# Patient Record
Sex: FEMALE | Race: BLACK OR AFRICAN AMERICAN | Employment: FULL TIME | URBAN - METROPOLITAN AREA
[De-identification: names, ages, dates, MRNs, and addresses within clinical notes are randomized per-mention and may not be internally consistent; named-entity substitution may affect disease eponyms.]

---

## 2023-09-20 ENCOUNTER — OFFICE VISIT (OUTPATIENT)
Dept: OCCUPATIONAL MEDICINE | Age: 48
End: 2023-09-20
Attending: FAMILY MEDICINE

## 2025-03-24 ENCOUNTER — OFFICE VISIT (OUTPATIENT)
Dept: RHEUMATOLOGY | Facility: CLINIC | Age: 50
End: 2025-03-24
Payer: MEDICAID

## 2025-03-24 VITALS
DIASTOLIC BLOOD PRESSURE: 72 MMHG | RESPIRATION RATE: 16 BRPM | HEIGHT: 60 IN | BODY MASS INDEX: 31.83 KG/M2 | HEART RATE: 81 BPM | OXYGEN SATURATION: 99 % | TEMPERATURE: 97 F | SYSTOLIC BLOOD PRESSURE: 120 MMHG | WEIGHT: 162.13 LBS

## 2025-03-24 DIAGNOSIS — M77.01 GOLFERS ELBOW OF RIGHT UPPER EXTREMITY: ICD-10-CM

## 2025-03-24 DIAGNOSIS — G47.09 OTHER INSOMNIA: ICD-10-CM

## 2025-03-24 DIAGNOSIS — Z51.81 THERAPEUTIC DRUG MONITORING: ICD-10-CM

## 2025-03-24 DIAGNOSIS — L40.50 PSORIATIC ARTHRITIS (HCC): Primary | ICD-10-CM

## 2025-03-24 DIAGNOSIS — L40.9 PSORIASIS: ICD-10-CM

## 2025-03-24 DIAGNOSIS — M53.3 SACROILIAC JOINT DISEASE: ICD-10-CM

## 2025-03-24 DIAGNOSIS — M77.11 RIGHT TENNIS ELBOW: ICD-10-CM

## 2025-03-24 DIAGNOSIS — Z11.59 NEED FOR HEPATITIS C SCREENING TEST: ICD-10-CM

## 2025-03-24 DIAGNOSIS — Z11.59 NEED FOR HEPATITIS B SCREENING TEST: ICD-10-CM

## 2025-03-24 DIAGNOSIS — E55.9 VITAMIN D DEFICIENCY: ICD-10-CM

## 2025-03-24 DIAGNOSIS — Z11.1 SCREENING FOR TUBERCULOSIS: ICD-10-CM

## 2025-03-24 PROBLEM — S92.511P: Status: ACTIVE | Noted: 2024-05-07

## 2025-03-24 PROBLEM — M19.071 OSTEOARTHRITIS OF RIGHT FOOT: Status: ACTIVE | Noted: 2024-05-07

## 2025-03-24 PROBLEM — M54.16 LUMBAR RADICULOPATHY: Status: ACTIVE | Noted: 2024-08-19

## 2025-03-24 PROBLEM — M20.40 HAMMER TOE, ACQUIRED: Status: ACTIVE | Noted: 2024-05-07

## 2025-03-24 RX ORDER — PHYTONADIONE 2 MG/ML
1 INJECTION, EMULSION INTRAMUSCULAR; INTRAVENOUS; SUBCUTANEOUS DAILY
COMMUNITY

## 2025-03-24 RX ORDER — IBUPROFEN 600 MG/1
600 TABLET, FILM COATED ORAL EVERY 6 HOURS PRN
COMMUNITY
Start: 2023-12-15

## 2025-03-24 RX ORDER — LIDOCAINE 50 MG/G
1 PATCH TOPICAL
COMMUNITY
Start: 2023-12-14

## 2025-03-24 RX ORDER — LOPERAMIDE HYDROCHLORIDE 2 MG/1
2 CAPSULE ORAL 4 TIMES DAILY PRN
COMMUNITY
Start: 2025-03-14

## 2025-03-24 RX ORDER — HYDROCODONE BITARTRATE AND ACETAMINOPHEN 5; 325 MG/1; MG/1
1 TABLET ORAL EVERY 4 HOURS
COMMUNITY

## 2025-03-24 NOTE — PROGRESS NOTES
Rheumatology New Patient Note  =====================================================================================================    Date of visit: 3/24/2025  ?  Chief complaint: +RF, polyarthralgia  Chief Complaint   Patient presents with    New Patient     Referred by primary for elevated rheumatoid factor. Patient has pain in her elbow, both knees, back, and toes on her rt foot.   Rapid 3 score is 5.7     ?  Referring (will send letter)  PCP  Kurtis Poon  [facility] Phone: [phone] ext [phone]  [facility] Fax: [phone]    =====================================================================================================  HPI    Niharika Ambrocio is a 49 year old female     Here for evaluation of positive rheumatoid factor and polyarthralgia.    She experiences significant joint pain, particularly in her left greater than right lateral and medial elbows.  Diagnosed with lateral epicondylitis in the past.  She has tried physical therapy, including exercises like squeezing balls of different sizes and strengths, but has not found significant relief. Pain is also present in her knuckles and fingers, which sometimes 'drop by themselves'.  -Takes considerable amounts of ibuprofen which does help.    She has a history of back pain, which she attributes to her  service.  She did not have back pain prior to her  service.  Various treatments including systemic steroids, lumbar epidural injections, physical therapy, and chiropractic care have been attempted, but she continues to experience persistent pain. The pain worsens in cold weather and is described as 'horrible'. She has been taking ibuprofen, but was advised to reduce the dosage due to concerns about liver health. She also reports using Norco and lidocaine patches without significant relief.    She experiences sleep disturbances, getting only about four hours of sleep per night. She has been using Tylenol PM and Aleve PM to aid sleep, but  has been taking up to five pills at a time, which she acknowledges is excessive. She has tried melatonin without success.  -She notes that she is quite resistant to any medications that cause sedation    Her family history is notable for arthritis in both parents, with her father also having psoriasis. She herself has a history of psoriasis but has not had an outbreak in years. She reports that her father used to make a juice cocktail for cramps, but it does not work for her.    She has a history of osteoarthritis and has broken toes in the right foot, which she believes are related to her  service. She also reports a history of sciatica, which she feels is not accurately diagnosed as it causes her entire back to hurt. She has allergies to red blood cell infusions, fentanyl, and iodine. She does not smoke, use chewing tobacco, or recreational drugs.    14 point ROS negative except noted above    Medications:   Cholecalciferol 50 MCG (2000 UT) Oral Cap Take 2,000 Units by mouth daily.      HYDROcodone-acetaminophen 5-325 MG Oral Tab Take 1 tablet by mouth every 4 (four) hours.      ibuprofen 600 MG Oral Tab Take 1 tablet (600 mg total) by mouth every 6 (six) hours as needed.      lidocaine 5 % External Patch Place 1 patch vaginally every 28 days.      loperamide 2 MG Oral Cap Take 1 capsule (2 mg total) by mouth 4 (four) times daily as needed.      phytonadione, vitamin K1, 1 mg/0.5mL Injection Solution 0.5 mL (1 mg total) daily.      amitriptyline 25 MG Oral Tab Take 1 tablet (25 mg total) by mouth nightly for 30 days, THEN 2 tablets (50 mg total) nightly. 150 tablet 1       Past Medical History:  Past Medical History:    Hyperlipidemia    Prediabetes     Past Surgical History:  Past Surgical History:   Procedure Laterality Date          x 3    Hysterectomy      partial    Other surgical history      breast reduction    Other surgical history      fibroid removal    Other surgical history Right      rt foot surgery     Family History:  Family History   Problem Relation Age of Onset    Other (arthritis) Father         RA     Social History:  Social History     Socioeconomic History    Marital status: Single   Tobacco Use    Smoking status: Never    Smokeless tobacco: Never   Vaping Use    Vaping status: Never Used   Substance and Sexual Activity    Alcohol use: Yes     Comment: ocassionally per pt    Drug use: Never     Social Drivers of Health     Food Insecurity: No Food Insecurity (2/25/2025)    Received from [facility]    Hunger Vital Sign     Worried About Running Out of Food in the Last Year: Never true     Ran Out of Food in the Last Year: Never true   Transportation Needs: No Transportation Needs (2/25/2025)    Received from [facility]    PRAPARE - Transportation     Lack of Transportation (Medical): No     Lack of Transportation (Non-Medical): No   Stress: Not at Risk (1/11/2025)    Received from [facility]    Stress     Stress: 1   Housing Stability: High Risk (2/25/2025)    Received from [facility]    Housing Stability Vital Sign     Unable to Pay for Housing in the Last Year: Yes     ?  Allergies:  Allergies[1]      Objective    Vitals:    03/24/25 1514   BP: 120/72   Pulse: 81   Resp: 16   Temp: 97 °F (36.1 °C)   SpO2: 99%   Weight: 162 lb 1.9 oz (73.5 kg)   Height: 5' (1.524 m)       GEN: NAD, well-nourished.   HEENT: Head: NCAT. Face: No lesions. Eyes: Conjunctiva clear. Sclera are anicteric. PERRLA. EOMs are full. Ears: The right and left ear canals are clear.  Nose: No external or internal nasal deformities. Nasal septum is midline. Mouth: The lips are within normal limits.  No oral ulcers Tongue is midline with no lesions. The oral cavity is clear.   Neck: Supple. No neck masses. No thyromegaly. No LAD, parotid or submandicular gland palpated.   PULM: easy effort  Extremities: No cyanosis, edema or deformities.   Neurologic: Strength,  CN2-12 grossly intact   Psych: normal affect.   Skin: No lesions or rashes.  MSK: 28 joint count performed. No evidence of synovitis in mcp, pip, dip, wrist, elbows, shoulders, hips, knees, ankles, mtp unless otherwise noted. Full ROM of elbows, wrists, knees.    Hands- No ulceration/lesions noted. No swelling in IPJs, no TTP or synovitis noted in joints of hand   Wrists- No pain with wrist flexion and extension. No swelling, erythema, or increased warmth.   Elbows- No swelling, erythema, or increased warmth.   Shoulders- FROM, abduction ~180 degrees bilaterally.    Knees- No swelling, erythema, or increased warmth. AROM flexion/extension ~0-180 degrees.    No valgus/varus laxities appreciated.   Ankles/Feet- No swelling, erythema, or increased warmth.    No peripheral joint synovitis    TTP in cervical/thoacic/lumbar spine pain  -TTP in the lateral/medial epicondyle of the right arm     [place]  ?Passive dorsiflexion of the fifth finger >90 degrees with forearm flat: no    ?Passive apposition of the thumb to the flexor aspect of the forearm: no    ?Hyperextension of elbow >10 degrees: yes on the right    ?Hyperextensibility of the knee >10 degrees: bilateral knee    ?Flexion of waist with palms on the floor (and with the knees fully extended): no    Labs:    3/2025  CBC W differential WNL  Creatinine 1.04, rest of CMP WNL  U/A without any protein    1/2025  Colonoscopy without any IBD    11/2024  Hepatitis B surface antigen, HCV negative  MANASA by IFA is negative  RPR is negative  RF 16.3    2021  RF 17.7  No results found for: \"WBC\", \"RBC\", \"HGB\", \"HCT\", \"PLT\", \"MPV\", \"MCV\", \"MCH\", \"MCHC\", \"RDW\", \"NEPRELIM\", \"NEUTABS\", \"LYMPHABS\", \"EOSABS\", \"BASABS\", \"NEUT\", \"LYMPH\", \"MON\", \"EOS\", \"BASO\", \"NEPERCENT\", \"LYPERCENT\", \"MOPERCENT\", \"EOPERCENT\", \"BAPERCENT\", \"NE\", \"LYMABS\", \"MOABSO\", \"EOABSO\", \"BAABSO\"  No results found for: \"GLU\", \"BUN\", \"BUNCREA\", \"CREATSERUM\", \"ANIONGAP\", \"GFR\", \"GFRNAA\", \"GFRAA\", \"CA\", \"OSMOCALC\",  \"ALKPHO\", \"AST\", \"ALT\", \"ALKPHOS\", \"BILT\", \"TP\", \"ALB\", \"GLOBULIN\", \"AGRATIO\", \"NA\", \"K\", \"CL\", \"CO2\"      No results found for: \"ANATI\", \"MANASA\", \"ANAS\", \"ANASCRN\", \"ANASCRNRFLX\", \"KENDRICK\"  No results found for: \"SSA\", \"SSAUR\", \"ANTISSA\", \"SSA52\", \"SSA60\", \"SSADD\", \"SSB\", \"ANTISSB\"  No results found for: \"DSDNA\", \"ANTIDSDNA\", \"SMUD\", \"ANTISM\", \"SM\", \"RNP\", \"ANTIRNP\", \"SMITHRNP\"  No results found for: \"SCL70\", \"SCL\", \"SDJGEKV24\"  No results found for: \"C3\", \"C4\"  No results found for: \"DRVVT\", \"LAINT\", \"PTTLUPUS\", \"LUPUSINTERP\", \"LA\", \"H4CF4UPNNK\", \"Q2OT6RQYSP\", \"X9MPSSXPMJ\", \"H0OYRXIVYS\"  No results found for: \"CARDIOLIPIGG\", \"CARDIOLIPIGM\", \"CARDIOLIPIGA\", \"CARDIOIGA\", \"CARLIP\"      Additional Labs:    Radiology:    12/2023 L-spine MRI  Findings:   No compression fractures or aggressive bone lesions are observed in the lumbar spine which tilts slightly to the right side. Small Schmorl's node defects along the endplates are scattered along the lumbar spine region. Minimal degenerative osteophytes are scattered in the L-spine with mild scattered disc space narrowing. The designated L5 vertebral body has mild transitional characteristics. Mild dural ectasia is noted in the upper sacral region of doubtful significance. Conus appears unremarkable.   No disc herniations, significant spinal stenosis or significant foraminal narrowing is noted on the study. A mild left-sided foraminal narrowing is noted at L3-4 without nerve root encroachment. There is at most minimal disc bulging and hypertrophied posterior elements scattered in the lumbar spine without significant encroachment on adjacent neural elements.   Impression:  Minimal degenerative changes are noted in the lumbar spine causing no significant encroachment on adjacent neural elements.     4/2024 XR right foot:  There is deformity of the medial aspect of the distal aspect of the proximal phalanx of the second digit.   This most likely is a remote deformity.   The  osseous structures are otherwise intact.   There is no acute fracture.   There is joint space narrowing and spurring of the first metatarsal-phalangeal joint.   The other joint spaces are maintained.   There is no joint effusion of the right ankle.     Radiology review:      =====================================================================================================  Assessment and Plan  Assessment:  1. Psoriatic arthritis (HCC)    2. Need for hepatitis B screening test    3. Screening for tuberculosis    4. Need for hepatitis C screening test    5. Sacroiliac joint disease    6. Right tennis elbow    7. Golfers elbow of right upper extremity    8. Therapeutic drug monitoring    9. Vitamin D deficiency    10. Other insomnia    11. Psoriasis      #Suspect Psoriatic Arthritis  #Psoriasis: long history  #Right lateral, right medial epicondylitis  #Chronic axial pain  -Given recalcitrant right lateral/medial epicondylitis and chronic axial pain in the context of known history of psoriasis, query peripheral + axial psoriatic arthritis   -Issues confounded by localized hypermobility of the bilateral knees and right elbow    #Low titer rheumatoid factor  -Suspect this is a false positive or clinically insignificant given the level is so low and there is no evidence of any small/medium joint inflammatory arthritis that would be consistent with rheumatoid arthritis    #Chronic Back Pain  -Relatively mild lumbar DJD based on last MRI from 2023  -Previous treatments including steroids, cortisone injections, and physical therapy were ineffective. Pain exacerbated by cold weather.     #Sleep Disturbance  Chronic sleep disturbance with only 4 hours of sleep per night. Previous use of Tylenol PM and Aleve PM was ineffective.    #known Vitamin D Deficiency  Severe vitamin D deficiency, currently taking vitamin D with K2 supplements.    Plan:  - Order additional blood work to assess for inflammatory arthritis, general  arthralgia testing.  Add on HLA-B27, CCP, uric acid, hepatitis B core antibody total, Quant gold, TSH, ferritin, B12/folate  - Order x-ray of the sacroiliac joint to evaluate for sacroiliitis, right elbow x-ray to evaluate for enthesopathy/fracture  -Could consider methotrexate and Otezla in the future as the patient is only partially controlled with high-dose ibuprofen.  - Refer to Dr. Martínez for evaluation and possible cortisone injection for tennis elbow and golfer's elbow.  - Recommend Voltaren gel for elbow pain. Consider counterforce brace  -Home exercise program from the [employer]  - Prescribe amitriptyline 25 mg for sleep, increase to 50 mg if needed after one month.  This will help with any nociplastic pain/pain amplification that may be contributing to her chronic low back pain  - Continue current vitamin D supplementation regimen.    Rtc 6 weeks    Diagnoses and all orders for this visit:    Psoriatic arthritis (HCC)  -     Hep B Core AB, Tot; Future  -     Quantiferon TB Plus; Future  -     HLA B 27 Disease Association; Future  -     Uric Acid; Future  -     Cyclic Citrullinate Pep. IGG; Future  -     TSH W Reflex To Free T4; Future  -     B12 AND FOLATE; Future  -     Ferritin; Future  -     Physiatry Referral - [place]    Need for hepatitis B screening test  -     Hep B Core AB, Tot; Future    Screening for tuberculosis  -     Quantiferon TB Plus; Future    Need for hepatitis C screening test    Sacroiliac joint disease  -     XR SACROILIAC JOINTS (MIN 3 VIEWS) (CPT=72202); Future  -     XR ELBOW, COMPLETE (MIN 3 VIEWS), RIGHT (CPT=73080); Future    Right tennis elbow  -     Physiatry Referral - [place]  -     XR SACROILIAC JOINTS (MIN 3 VIEWS) (CPT=72202); Future  -     XR ELBOW, COMPLETE (MIN 3 VIEWS), RIGHT (CPT=73080); Future    Golfers elbow of right upper extremity  -     Physiatry Referral - [place]    Therapeutic drug monitoring  -     Hep B Core AB, Tot;  Future  -     Quantiferon TB Plus; Future  -     HLA B 27 Disease Association; Future  -     Uric Acid; Future  -     Cyclic Citrullinate Pep. IGG; Future  -     TSH W Reflex To Free T4; Future  -     B12 AND FOLATE; Future  -     Ferritin; Future  -     Physiatry Referral - [place]  -     XR SACROILIAC JOINTS (MIN 3 VIEWS) (CPT=72202); Future  -     XR ELBOW, COMPLETE (MIN 3 VIEWS), RIGHT (CPT=73080); Future  -     Vitamin D, 25-Hydroxy; Future    Vitamin D deficiency  -     Hep B Core AB, Tot; Future  -     Quantiferon TB Plus; Future  -     HLA B 27 Disease Association; Future  -     Uric Acid; Future  -     Cyclic Citrullinate Pep. IGG; Future  -     TSH W Reflex To Free T4; Future  -     B12 AND FOLATE; Future  -     Ferritin; Future  -     Physiatry Referral - [place]  -     XR SACROILIAC JOINTS (MIN 3 VIEWS) (CPT=72202); Future  -     XR ELBOW, COMPLETE (MIN 3 VIEWS), RIGHT (CPT=73080); Future  -     Vitamin D, 25-Hydroxy; Future    Other insomnia  -     amitriptyline 25 MG Oral Tab; Take 1 tablet (25 mg total) by mouth nightly for 30 days, THEN 2 tablets (50 mg total) nightly.    Psoriasis        Return in about 6 weeks (around 5/5/2025).      The above plan of care, diagnosis, orders, and follow-up were discussed with the patient. Questions related to this recommended plan of care were answered.    Thank you for referring this delightful patient to me. Please feel free to contact me with any questions.     This report was performed utilizing speech recognition software technology. Despite proofreading, speech recognition errors could escape detection. If a word or phrase is confusing or out of context, please do not hesitate to call for   clarification.       Kind regards      David Goode MD  [facility]         [1]   Allergies  Allergen Reactions    Red Blood Cells UNKNOWN     Other reaction(s): Transfusion reaction   Anti-K      For additional information, please contact [facility] at  [phone].    Fentanyl DIZZINESS, OTHER (SEE COMMENTS) and NAUSEA AND VOMITING    Iodine (Topical) ITCHING and RASH    Povidone Iodine RASH

## 2025-03-24 NOTE — PATIENT INSTRUCTIONS
See Dr. Martínez for tennis/golfer's elbow; consider injection    Get x-ray of the sacroiliac joint and right elbow              Purchase Voltaren (diclofenac) 1% gel over-the-counter. It is in an orange packaging.     Total dose should not exceed 32 g per day, overall affected joints. Voltaren Gel should  be measured onto the enclosed dosing card to the appropriate 2 g or 4 g designation.   Knees: Apply the gel (4 g) to the affected area up to 4 times daily. Do not apply  more than 16 g daily to any one affected joint of the lower extremities.   Hands, wrists, elbows: Apply the gel (2 g) to the affected area up to 4 times daily. Do not apply  more than 8 g daily to any one affected joint of the upper extremities

## 2025-05-27 ENCOUNTER — HOSPITAL ENCOUNTER (OUTPATIENT)
Dept: GENERAL RADIOLOGY | Age: 50
Discharge: HOME | End: 2025-05-27
Attending: INTERNAL MEDICINE
Payer: MEDICAID

## 2025-05-27 ENCOUNTER — OFFICE VISIT (OUTPATIENT)
Dept: RHEUMATOLOGY | Facility: CLINIC | Age: 50
End: 2025-05-27
Payer: MEDICAID

## 2025-05-27 VITALS
WEIGHT: 164 LBS | RESPIRATION RATE: 16 BRPM | HEIGHT: 60 IN | TEMPERATURE: 98 F | SYSTOLIC BLOOD PRESSURE: 128 MMHG | DIASTOLIC BLOOD PRESSURE: 88 MMHG | OXYGEN SATURATION: 97 % | BODY MASS INDEX: 32.2 KG/M2 | HEART RATE: 80 BPM

## 2025-05-27 DIAGNOSIS — M25.561 ACUTE PAIN OF RIGHT KNEE: ICD-10-CM

## 2025-05-27 DIAGNOSIS — E55.9 VITAMIN D DEFICIENCY: ICD-10-CM

## 2025-05-27 DIAGNOSIS — L40.50 PSORIATIC ARTHRITIS (HCC): Primary | ICD-10-CM

## 2025-05-27 DIAGNOSIS — M77.11 RIGHT TENNIS ELBOW: ICD-10-CM

## 2025-05-27 DIAGNOSIS — L40.9 PSORIASIS: ICD-10-CM

## 2025-05-27 DIAGNOSIS — Z51.81 THERAPEUTIC DRUG MONITORING: ICD-10-CM

## 2025-05-27 DIAGNOSIS — M53.3 SACROILIAC JOINT DISEASE: ICD-10-CM

## 2025-05-27 PROBLEM — M06.9 RHEUMATOID ARTHRITIS (HCC): Status: ACTIVE | Noted: 2019-09-12

## 2025-05-27 PROCEDURE — 99214 OFFICE O/P EST MOD 30 MIN: CPT | Performed by: INTERNAL MEDICINE

## 2025-05-27 PROCEDURE — 73562 X-RAY EXAM OF KNEE 3: CPT | Performed by: INTERNAL MEDICINE

## 2025-05-27 PROCEDURE — 73080 X-RAY EXAM OF ELBOW: CPT | Performed by: INTERNAL MEDICINE

## 2025-05-27 PROCEDURE — 72202 X-RAY EXAM SI JOINTS 3/> VWS: CPT | Performed by: INTERNAL MEDICINE

## 2025-05-27 RX ORDER — ONDANSETRON 4 MG/1
4 TABLET, ORALLY DISINTEGRATING ORAL AS NEEDED
COMMUNITY
Start: 2025-04-05 | End: 2025-05-27 | Stop reason: ALTCHOICE

## 2025-05-27 NOTE — PROGRESS NOTES
Rheumatology f/u Patient Note  =====================================================================================================    Chief complaint: +RF, polyarthralgia  Chief Complaint   Patient presents with    Follow - Up     LOV 3/24/2025    Patient states she is very sore lately.   Rapid 3 score is 6.0     Referring  PCP  Kurtis Poon  [facility] Phone: [phone] ext [phone]  [facility] Fax: [phone]  =====================================================================================================  HPI Date of visit: 3/24/2025    Niharika Ambrocio is a 49 year old female     Here for evaluation of positive rheumatoid factor and polyarthralgia.    She experiences significant joint pain, particularly in her left greater than right lateral and medial elbows.  Diagnosed with lateral epicondylitis in the past.  She has tried physical therapy, including exercises like squeezing balls of different sizes and strengths, but has not found significant relief. Pain is also present in her knuckles and fingers, which sometimes 'drop by themselves'.  -Takes considerable amounts of ibuprofen which does help.    She has a history of back pain, which she attributes to her  service.  She did not have back pain prior to her  service.  Various treatments including systemic steroids, lumbar epidural injections, physical therapy, and chiropractic care have been attempted, but she continues to experience persistent pain. The pain worsens in cold weather and is described as 'horrible'. She has been taking ibuprofen, but was advised to reduce the dosage due to concerns about liver health. She also reports using Norco and lidocaine patches without significant relief.    She experiences sleep disturbances, getting only about four hours of sleep per night. She has been using Tylenol PM and Aleve PM to aid sleep, but has been taking up to five pills at a time, which she acknowledges is excessive. She has tried  melatonin without success.  -She notes that she is quite resistant to any medications that cause sedation    Her family history is notable for arthritis in both parents, with her father also having psoriasis. She herself has a history of psoriasis but has not had an outbreak in years. She reports that her father used to make a juice cocktail for cramps, but it does not work for her.    She has a history of osteoarthritis and has broken toes in the right foot, which she believes are related to her  service. She also reports a history of sciatica, which she feels is not accurately diagnosed as it causes her entire back to hurt. She has allergies to red blood cell infusions, fentanyl, and iodine. She does not smoke, use chewing tobacco, or recreational drugs.  ==============================================================================================================  Today's Visit: 05/27/25    She experiences persistent joint pain and soreness, particularly in her elbows, fingers, and knees. The pain has not improved with warmer weather. Her fingers feel tight and sore without visible swelling, although she has experienced swelling in the past. Her knees were swollen last week, requiring a knee brace for mobility, especially when driving. She has difficulty with weight-bearing, impacting her ability to navigate steps, necessitating assistance.    She takes ibuprofen 600 mg once daily, primarily when she needs to be active, but is unsure of its effectiveness. She uses a lidocaine patch on her back, which aids her sleep without causing disturbing dreams like cyclobenzaprine did. She recalls a past foot surgery where Ambien was effective for sleep, unlike her current regimen.  - Trial of amitriptyline since last visit but it was not effective so she stopped it    She has a history of psoriasis, with the last significant outbreak in 2015. Severe outbreaks in the past affected her arms, neck, and face, leading to  work absences. No recent flare-ups have occurred since her father's death in 2015.    She has experienced weight gain, attributing it to joint issues rather than medication side effects. She has not had an x-ray due to losing the order but completed blood work, which was mostly normal. No recent injuries explain her joint symptoms.    5 point ROS negative except noted above  I had reviewed past medical and family histories together with allergy and medication lists documented.      Medications:   Cholecalciferol 50 MCG (2000 UT) Oral Cap Take 2,000 Units by mouth daily.      ibuprofen 600 MG Oral Tab Take 1 tablet (600 mg total) by mouth every 6 (six) hours as needed.      lidocaine 5 % External Patch Place 1 patch vaginally every 28 days.      amitriptyline 25 MG Oral Tab Take 1 tablet (25 mg total) by mouth nightly for 30 days, THEN 2 tablets (50 mg total) nightly. 150 tablet 1     ?  Allergies:  Allergies[1]      Objective    Vitals:    05/27/25 0852   BP: 128/88   Pulse: 80   Resp: 16   Temp: 98.2 °F (36.8 °C)   SpO2: 97%   Weight: 164 lb (74.4 kg)   Height: 5' (1.524 m)     GEN: NAD, well-nourished.   HEENT: Head: NCAT. Face: No lesions. Eyes: Conjunctiva clear.   PULM:  easy effort  Extremities: No cyanosis, edema or deformities.   Neurologic: Strength, CN2-12 grossly intact   Skin: No lesions or rashes.  MSK: 28 joint count performed. No evidence of synovitis in mcp, pip, dip, wrist, elbows, shoulders, hips, knees, ankles, mtp unless otherwise noted. Full ROM of elbows, wrists, knees.     Right knee with mild effusion and synovitis and tenderness    TTP in cervical/thoacic/lumbar spine pain  -TTP in the lateral/medial epicondyle of the right and left arm     Labs:  4/5/2025  CBC W differential WNL  Creatinine 0.87, rest of CMP WNL  Quant gold is negative  Hepatitis B core antibody total is negative  HLA-B27 is indeterminant  Uric acid 4.1  CCP negative  TSH WNL  Ferritin, vitamin D WNL    3/2025  CBC W  differential WNL  Creatinine 1.04, rest of CMP WNL  U/A without any protein    1/2025  Colonoscopy without any IBD    11/2024  Hepatitis B surface antigen, HCV negative  MANASA by IFA is negative  RPR is negative  RF 16.3    2021  RF 17.7  No results found for: \"WBC\", \"RBC\", \"HGB\", \"HCT\", \"PLT\", \"MPV\", \"MCV\", \"MCH\", \"MCHC\", \"RDW\", \"NEPRELIM\", \"NEUTABS\", \"LYMPHABS\", \"EOSABS\", \"BASABS\", \"NEUT\", \"LYMPH\", \"MON\", \"EOS\", \"BASO\", \"NEPERCENT\", \"LYPERCENT\", \"MOPERCENT\", \"EOPERCENT\", \"BAPERCENT\", \"NE\", \"LYMABS\", \"MOABSO\", \"EOABSO\", \"BAABSO\"  No results found for: \"GLU\", \"BUN\", \"BUNCREA\", \"CREATSERUM\", \"ANIONGAP\", \"GFR\", \"GFRNAA\", \"GFRAA\", \"CA\", \"OSMOCALC\", \"ALKPHO\", \"AST\", \"ALT\", \"ALKPHOS\", \"BILT\", \"TP\", \"ALB\", \"GLOBULIN\", \"AGRATIO\", \"NA\", \"K\", \"CL\", \"CO2\"      No results found for: \"ANATI\", \"MANASA\", \"ANAS\", \"ANASCRN\", \"ANASCRNRFLX\", \"KENDRICK\"  No results found for: \"SSA\", \"SSAUR\", \"ANTISSA\", \"SSA52\", \"SSA60\", \"SSADD\", \"SSB\", \"ANTISSB\"  No results found for: \"DSDNA\", \"ANTIDSDNA\", \"SMUD\", \"ANTISM\", \"SM\", \"RNP\", \"ANTIRNP\", \"SMITHRNP\"  No results found for: \"SCL70\", \"SCL\", \"TJVWERC20\"  No results found for: \"C3\", \"C4\"  No results found for: \"DRVVT\", \"LAINT\", \"PTTLUPUS\", \"LUPUSINTERP\", \"LA\", \"H2UH7NCNAC\", \"K6CO7QDJOF\", \"A6RFYZNEDF\", \"P8QIJNOQWX\"  No results found for: \"CARDIOLIPIGG\", \"CARDIOLIPIGM\", \"CARDIOLIPIGA\", \"CARDIOIGA\", \"CARLIP\"      Additional Labs:    Radiology:    12/2023 L-spine MRI  Findings:   No compression fractures or aggressive bone lesions are observed in the lumbar spine which tilts slightly to the right side. Small Schmorl's node defects along the endplates are scattered along the lumbar spine region. Minimal degenerative osteophytes are scattered in the L-spine with mild scattered disc space narrowing. The designated L5 vertebral body has mild transitional characteristics. Mild dural ectasia is noted in the upper sacral region of doubtful significance. Conus appears unremarkable.   No disc herniations, significant  spinal stenosis or significant foraminal narrowing is noted on the study. A mild left-sided foraminal narrowing is noted at L3-4 without nerve root encroachment. There is at most minimal disc bulging and hypertrophied posterior elements scattered in the lumbar spine without significant encroachment on adjacent neural elements.   Impression:  Minimal degenerative changes are noted in the lumbar spine causing no significant encroachment on adjacent neural elements.     4/2024 XR right foot:  There is deformity of the medial aspect of the distal aspect of the proximal phalanx of the second digit.   This most likely is a remote deformity.   The osseous structures are otherwise intact.   There is no acute fracture.   There is joint space narrowing and spurring of the first metatarsal-phalangeal joint.   The other joint spaces are maintained.   There is no joint effusion of the right ankle.     Radiology review:  XR KNEE (3 VIEWS), RIGHT (CPT=73562)  Addendum Date: 5/28/2025  ADDENDUM:  Correction to prior report due to voice recognition error:   CONCLUSION:  NO acute fractures.   Dictated by (CST): Jevon Pham MD on 5/28/2025 at 9:09 AM     Finalized by (CST): Jevon Pham MD on 5/28/2025 at 9:10 AM             Result Date: 5/28/2025  CONCLUSION:  Acute fractures.   LOCATION:  [facility]   Dictated by (CST): Jevon Pham MD on 5/27/2025 at 3:49 PM     Finalized by (CST): Jevon Pham MD on 5/27/2025 at 3:49 PM       XR SACROILIAC JOINTS (MIN 3 VIEWS) (CPT=72202)  Result Date: 5/27/2025  CONCLUSION:    1. No sacroiliac joint fracture, destructive lesion, erosion, ankylosis, widening, or asymmetry.  2. Degenerative changes:  Minimal, not excessive for age, symmetric.   LOCATION:  [facility]       Dictated by (CST): Iglesia Barreto MD on 5/27/2025 at 11:43 AM     Finalized by (CST): Iglesia Barreto MD on 5/27/2025 at 11:43 AM       XR ELBOW, COMPLETE (MIN 3 VIEWS), RIGHT (CPT=73080)  Result Date:  5/27/2025  CONCLUSION:  No acute fracture or other acute bony process.  LOCATION:  [facility]    Dictated by (CST): Iglesia Barreto MD on 5/27/2025 at 11:41 AM     Finalized by (CST): Iglesia Barreto MD on 5/27/2025 at 11:43 AM       =====================================================================================================  Assessment and Plan  Assessment:  1. Psoriatic arthritis (HCC)    2. Acute pain of right knee    3. Right tennis elbow    4. Therapeutic drug monitoring    5. Psoriasis        #Suspect Psoriatic Arthritis  #Psoriasis: long history  #Right lateral, right medial epicondylitis  #right knee pain, acute  #Chronic axial pain  -Given recalcitrant right lateral/medial epicondylitis and chronic axial pain in the context of known history of psoriasis, query peripheral + axial psoriatic arthritis   -Issues confounded by localized hypermobility of the bilateral knees and right elbow    #Low titer rheumatoid factor  - Likely a false positive    #Chronic Back Pain  -Relatively mild lumbar DJD based on last MRI from 2023  -Previous treatments including steroids, cortisone injections, and physical therapy were ineffective. Pain exacerbated by cold weather.     #Sleep Disturbance  Chronic sleep disturbance with only 4 hours of sleep per night. Previous use of Tylenol PM and Aleve PM was ineffective.  -flexeril: unusual dreams  -amitriptyline: failed     #known Vitamin D Deficiency  Severe vitamin D deficiency, currently taking vitamin D with K2 supplements.    High risk medication labs including CMP and CBC w/ diff reviewed from 4/2025. Results are stable.   -11/2024: HCV, hep B neg  -4/2025: IGRA    Plan:  - Order x-ray of the sacroiliac joint to evaluate for sacroiliitis, right elbow x-ray to evaluate for enthesopathy/fracture  -Right knee x-ray to evaluate for any arthritis given acute knee pain  - Refer to Dr. Martínez for evaluation and possible cortisone injection for tennis elbow and golfer's  elbow.  - Recommend Voltaren gel for elbow pain. Consider counterforce brace  -Home exercise program from the [employer]  -Given failure of NSAIDs, will start immunomodulatory therapy for psoriatic arthritis.  Patient adamantly declines methotrexate given she already is struggling with hair thinning and does not want to risk alopecia  - Will start Otezla starter pack.  Discussed risks of increased suicidal ideation/depression/diarrhea.  Patient has constipation so this might actually help out  -consider PSG in the future     The patient accepted verbally to take Apremilast, and the side effects discussed with the patient including:  -   headache, depression (2%), paresthesia , Skin rash (<2%), folliculitis, Weight loss,  Diarrhea (18%), nausea (17%), vomiting (4%), decreased appetite (3%), abdominal distress (2%), abdominal pain, upper abdominal pain (2%), abdominal distention, gastroesophageal reflux disease, Hypersensitivity, Influenza , tooth abscess (1%), Back pain, arthralgia, muscle spasm, myalgia, Upper respiratory tract infection (8%), cough    - very rare: Atrial fibrillation, exacerbation of psoriasis (rebound following discontinuation), tachyarrhythmia      Rtc 3 months     Diagnoses and all orders for this visit:    Psoriatic arthritis (HCC)    Acute pain of right knee  -     XR KNEE (3 VIEWS), RIGHT (CPT=73562); Future    Right tennis elbow    Therapeutic drug monitoring    Psoriasis          Return in about 3 months (around 8/27/2025).      The above plan of care, diagnosis, orders, and follow-up were discussed with the patient. Questions related to this recommended plan of care were answered.    Thank you for referring this delightful patient to me. Please feel free to contact me with any questions.     This report was performed utilizing speech recognition software technology. Despite proofreading, speech recognition errors could escape detection. If a word or phrase is  confusing or out of context, please do not hesitate to call for   clarification.       Kind regards      David Goode MD  [facility]         [1]   Allergies  Allergen Reactions    Red Blood Cells UNKNOWN     Other reaction(s): Transfusion reaction   Anti-K      For additional information, please contact [facility] at [phone].    Fentanyl DIZZINESS, OTHER (SEE COMMENTS) and NAUSEA AND VOMITING    Iodine (Topical) ITCHING and RASH    Povidone Iodine RASH

## 2025-05-27 NOTE — PATIENT INSTRUCTIONS
1) methotrexate: declines b/c alopecia  -cannot do this.cannot risk this,.    2) Otezla: stop    3) take ibuprofen three times daily with food for the next month or so     Do X-rays of the elbow, right knee, sacroiliac joints    See Dr. Martínez for elbow steroid injection  Referred to Provider Information:  Provider Address Phone   Andrew Martínez DO [address] [place] [id number] [phone]

## 2025-05-27 NOTE — H&P
The following individual(s) verbally consented to be recorded using ambient AI listening technology and understand that they can each withdraw their consent to this listening technology at any point by asking the clinician to turn off or pause the recording:    Patient name: Niharika MISTRY Lolly  Additional names:

## 2025-05-28 ENCOUNTER — TELEPHONE (OUTPATIENT)
Dept: RHEUMATOLOGY | Facility: CLINIC | Age: 50
End: 2025-05-28

## 2025-05-28 NOTE — TELEPHONE ENCOUNTER
Spoke with Samaria in [facility] regarding result of right knee xray. States she will inform the radiologist who read the xray.

## 2025-05-29 ENCOUNTER — TELEPHONE (OUTPATIENT)
Facility: CLINIC | Age: 50
End: 2025-05-29

## 2025-05-29 NOTE — TELEPHONE ENCOUNTER
Call pt, right knee, SI joint, right elbow X-rays all normal..  Suspect pain is due to psoriatic arthritis

## 2025-05-29 NOTE — PROGRESS NOTES
ATTN PROVIDER: You asked [facility] to cover the drug, Otezla. It  was reviewed using our Otezla guideline. We have denied your request. The member  has psoriatic arthritis. Otezla is not on our preferred list of drugs. We offer similar  alternatives. Your request may be approved if the member meets the following  conditions:  1. The member has tried methotrexate, leflunomide, or another conventional synthetic  drug (for example: sulfasalazine) administered at an adequate dose and duration.  2. The member must try three preferred drugs first. Some of the preferred drugs are  Cimzia, Cosentyx, Enbrel, adalimumab biosimilars (Adalimumab-adbm and Simlandi),  and Xeljanz (all require prior authorization)

## 2025-05-30 NOTE — TELEPHONE ENCOUNTER
Called pt to provide result as outlined below. Left detailed message in regards to details as outlined. Provided office number for any further questions; did state can leave a voice message or or send a MyChart as the office is closed for the weekend. Awaiting possible response. Signing encounter with no further action required from the office.

## 2025-06-23 DIAGNOSIS — M06.9 RHEUMATOID ARTHRITIS, INVOLVING UNSPECIFIED SITE, UNSPECIFIED WHETHER RHEUMATOID FACTOR PRESENT (HCC): Primary | ICD-10-CM

## 2025-06-23 NOTE — TELEPHONE ENCOUNTER
Approved today by [facility] 2017  Your PA request has been approved. Additional information will be provided in the approval communication. (Message [id number])  Effective Date: 6/16/2025  Authorization Expiration Date: 6/16/2026     Enbrel has been approved without taking a DMARD. Are you ok with starting Enbrel or would you like me to try another biologic.

## 2025-07-14 NOTE — TELEPHONE ENCOUNTER
Tried calling the patient.  However, there was no voicemail box set up and the phone did not even ring.    Could you try calling the patient and let her know Otezla was not approved, but Enbrel injection was. See if she is ok with starting Enbrel.

## 2025-07-16 RX ORDER — MEDROXYPROGESTERONE ACETATE 150 MG/ML
50 INJECTION, SUSPENSION INTRAMUSCULAR WEEKLY
Qty: 4.2 ML | Refills: 1 | Status: SHIPPED | OUTPATIENT
Start: 2025-07-16 | End: 2025-08-15

## 2025-07-16 NOTE — TELEPHONE ENCOUNTER
Patient NARINDER. Explained the Otezla was not approved, but Enbrel injection was. she is ok with starting Enbrel. Nurse teaching scheduled.

## 2025-08-27 ENCOUNTER — OFFICE VISIT (OUTPATIENT)
Dept: RHEUMATOLOGY | Facility: CLINIC | Age: 50
End: 2025-08-27

## 2025-08-27 VITALS
BODY MASS INDEX: 32.59 KG/M2 | DIASTOLIC BLOOD PRESSURE: 60 MMHG | WEIGHT: 166 LBS | HEART RATE: 80 BPM | SYSTOLIC BLOOD PRESSURE: 120 MMHG | HEIGHT: 60 IN | RESPIRATION RATE: 16 BRPM | TEMPERATURE: 98 F | OXYGEN SATURATION: 99 %

## 2025-08-27 DIAGNOSIS — Z51.81 THERAPEUTIC DRUG MONITORING: ICD-10-CM

## 2025-08-27 DIAGNOSIS — L40.9 PSORIASIS: ICD-10-CM

## 2025-08-27 DIAGNOSIS — L40.50 PSORIATIC ARTHRITIS (HCC): Primary | ICD-10-CM

## 2025-08-27 DIAGNOSIS — M25.561 ACUTE PAIN OF RIGHT KNEE: ICD-10-CM

## 2025-08-27 DIAGNOSIS — M25.461 EFFUSION OF RIGHT KNEE: ICD-10-CM

## 2025-08-27 RX ORDER — METHYLPREDNISOLONE 4 MG/1
TABLET ORAL
COMMUNITY
Start: 2025-08-20

## 2025-08-27 RX ORDER — TRIAMCINOLONE ACETONIDE 40 MG/ML
40 INJECTION, SUSPENSION INTRA-ARTICULAR; INTRAMUSCULAR ONCE
Status: SHIPPED | OUTPATIENT
Start: 2025-08-27

## 2025-08-27 RX ORDER — TRIAMCINOLONE ACETONIDE 40 MG/ML
40 INJECTION, SUSPENSION INTRA-ARTICULAR; INTRAMUSCULAR ONCE
Status: DISCONTINUED | OUTPATIENT
Start: 2025-08-27 | End: 2025-08-27

## 2025-08-27 RX ORDER — METHOCARBAMOL 750 MG/1
750 TABLET, FILM COATED ORAL 3 TIMES DAILY
COMMUNITY
Start: 2025-08-20

## (undated) NOTE — Clinical Note
Please start STAT auth for:  Medication: Otezla Dose: 30 mg BID Diagnosis: psoriatic arthritis   Please wait until I finish my note before submitting auth.